# Patient Record
Sex: MALE | Race: WHITE | URBAN - METROPOLITAN AREA
[De-identification: names, ages, dates, MRNs, and addresses within clinical notes are randomized per-mention and may not be internally consistent; named-entity substitution may affect disease eponyms.]

---

## 2017-07-13 ENCOUNTER — OFFICE VISIT (OUTPATIENT)
Dept: OPHTHALMOLOGY | Facility: CLINIC | Age: 82
End: 2017-07-13

## 2017-07-13 DIAGNOSIS — Z86.69 HISTORY OF RETINAL DETACHMENT: ICD-10-CM

## 2017-07-13 DIAGNOSIS — H25.13 SENILE NUCLEAR SCLEROSIS, BILATERAL: ICD-10-CM

## 2017-07-13 DIAGNOSIS — H52.223 REGULAR ASTIGMATISM OF BOTH EYES: Primary | ICD-10-CM

## 2017-07-13 ASSESSMENT — REFRACTION_WEARINGRX
OD_AXIS: 165
OS_AXIS: 40
OS_CYLINDER: +0.75
OD_ADD: +2.50
OS_SPHERE: PLANO
SPECS_TYPE: PAL
OD_CYLINDER: +0.50
OS_ADD: +2.50
OD_SPHERE: +0.25

## 2017-07-13 ASSESSMENT — REFRACTION_MANIFEST
OD_AXIS: 180
OS_ADD: +2.75
OD_SPHERE: PLANO
OS_CYLINDER: +0.50
OS_SPHERE: +0.25
OD_CYLINDER: +0.75
OS_AXIS: 180
OD_ADD: +2.75

## 2017-07-13 ASSESSMENT — TONOMETRY
IOP_METHOD: ICARE
OS_IOP_MMHG: 15
OD_IOP_MMHG: 14

## 2017-07-13 ASSESSMENT — VISUAL ACUITY
OS_CC+: -2
OS_CC: J2
OS_CC: 20/50
METHOD: SNELLEN - LINEAR
CORRECTION_TYPE: GLASSES
OD_CC+: -2
OD_CC: 20/30
OD_CC: J2

## 2017-07-13 ASSESSMENT — CUP TO DISC RATIO
OS_RATIO: 0.2
OD_RATIO: 0.2

## 2017-07-13 ASSESSMENT — EXTERNAL EXAM - LEFT EYE: OS_EXAM: NORMAL

## 2017-07-13 ASSESSMENT — SLIT LAMP EXAM - LIDS
COMMENTS: NORMAL
COMMENTS: NORMAL

## 2017-07-13 ASSESSMENT — EXTERNAL EXAM - RIGHT EYE: OD_EXAM: NORMAL

## 2017-07-13 ASSESSMENT — CONF VISUAL FIELD
OS_NORMAL: 1
OD_NORMAL: 1

## 2017-07-13 NOTE — NURSING NOTE
Chief Complaints and History of Present Illnesses   Patient presents with     Cataract Follow Up     May be slightly Blurred since last visit     HPI    Symptoms:     No floaters   No flashes      Duration:  2 years   Frequency:  Constant          Comments:  Glenny Vera COT 1:59 PM July 13, 2017

## 2017-07-13 NOTE — MR AVS SNAPSHOT
After Visit Summary   2017    You Sotelo    MRN: [id number]           Patient Information     Date Of Birth          1935        Visit Information        Provider Department      2017 2:00 PM Prashant Tyler OD [facility]        Today's Diagnoses     History of retinal detachment    -  1       Follow-ups after your visit        Who to contact     Please call your clinic at [phone] to:    Ask questions about your health    Make or cancel appointments    Discuss your medicines    Learn about your test results    Speak to your doctor   If you have compliments or concerns about an experience at your clinic, or if you wish to file a complaint, please contact [facility] Patient Relations at [phone] or email us at [email or web address]         Additional Information About Your Visit        MyChart Information     [employer] is an electronic gateway that provides easy, online access to your medical records. With [employer], you can request a clinic appointment, read your test results, renew a prescription or communicate with your care team.     To sign up for [employer] visit the website at [email or web address]   You will be asked to enter the access code listed below, as well as some personal information. Please follow the directions to create your username and password.     Your access code is: [email or web address]  Expires: 10/4/2017  6:30 AM     Your access code will  in 90 days. If you need help or a new code, please contact your [facility] or call [phone] for assistance.        Care EveryWhere ID     This is your Care EveryWhere ID. This could be used by other organizations to access your [place] medical records  [id number]         Blood Pressure from Last 3 Encounters:   09/28/15 113/77    Weight from Last 3 Encounters:   10/09/12 101.1 kg (222 lb 12.8 oz)   12  102.6 kg (226 lb 1.6 oz)   08/29/12 102.1 kg (225 lb)              We Performed the Following     OCT Retina Spectralis OU (both eyes)        Primary Care Provider Office Phone # Fax #    [place] VIRGEN Aragon [phone]       [facility]  GENERAL [facility] CLN [address] [facility] [address]        Equal Access to Services     [facility]: Hadii aad ku hadasho Soomaali, waaxda luqadaha, qaybta kaalmada adeegyada, waxay idiin hayaan adeeg kharash la'aan . So [facility] [phone].    ATENCIÓN: Si habla español, tiene a canas disposición servicios gratuitos de asistencia lingüística. Llame al [phone].    We comply with applicable federal civil rights laws and [place] laws. We do not discriminate on the basis of race, color, national origin, age, disability sex, sexual orientation or gender identity.            Thank you!     Thank you for choosing [facility]  for your care. Our goal is always to provide you with excellent care. Hearing back from our patients is one way we can continue to improve our services. Please take a few minutes to complete the written survey that you may receive in the mail after your visit with us. Thank you!             Your Updated Medication List - Protect others around you: Learn how to safely use, store and throw away your medicines at www.disposemymeds.org.          This list is accurate as of: 7/13/17  2:31 PM.  Always use your most recent med list.                   Brand Name Dispense Instructions for use Diagnosis    ALLOPURINOL PO      Take by mouth daily        ATORVASTATIN CALCIUM PO      Take by mouth daily        LEVOTHYROXINE SODIUM PO      Take by mouth daily        METOPROLOL TARTRATE PO           NITROGLYCERIN SL      Place 0.4 mg under the tongue        XARELTO PO

## 2017-07-13 NOTE — PROGRESS NOTES
Assessment/Plan  (H52.223) Regular astigmatism of both eyes  (primary encounter diagnosis)  Comment: Hyperopic astigmatism with presbyopia OU  Plan: Refraction   Educated patient on condition and clinical findings. Dispensed spectacle prescription for full time wear. Monitor annually.    (Z86.69) History of retinal detachment  Comment:  S/p repair with laser and gas bubble, per patient  Plan: OCT Retina Spectralis OU (both eyes)   Condition appears stable, macula flat, normal foveal contour. Blurred vision does not appear to be retinal in etiology.    (H25.13) Senile nuclear sclerosis, bilateral  Comment: Question of visual significance OS  Plan:  Referred to Dr. Monetlongo for consultation.      Complete documentation of historical and exam elements from today's encounter can  be found in the full encounter summary report (not reduplicated in this progress  note). I personally obtained the chief complaint(s) and history of present illness. I  confirmed and edited as necessary the review of systems, past medical/surgical  history, family history, social history, and examination findings as documented by  others; and I examined the patient myself. I personally reviewed the relevant tests,  images, and reports as documented above. I formulated and edited as necessary the  assessment and plan and discussed the findings and management plan with the  patient and family.    Prashant Tyler OD, FAAO

## 2017-08-02 ENCOUNTER — TELEPHONE (OUTPATIENT)
Dept: OPHTHALMOLOGY | Facility: CLINIC | Age: 82
End: 2017-08-02

## 2017-08-03 NOTE — TELEPHONE ENCOUNTER
I spoke with the patient and explained that given the decreased vision and normal retinal findings, I recommended a cataract consultation.  The patient understood, and will call to schedule once other medical issues are stable.

## 2017-08-15 DIAGNOSIS — H25.13 SENILE NUCLEAR SCLEROSIS, BILATERAL: Primary | ICD-10-CM

## 2017-08-29 ENCOUNTER — OFFICE VISIT (OUTPATIENT)
Dept: OPHTHALMOLOGY | Facility: CLINIC | Age: 82
End: 2017-08-29

## 2017-08-29 DIAGNOSIS — H52.203 HYPEROPIC ASTIGMATISM OF BOTH EYES: ICD-10-CM

## 2017-08-29 DIAGNOSIS — H33.22 OLD RETINAL DETACHMENT OF LEFT EYE: ICD-10-CM

## 2017-08-29 DIAGNOSIS — H52.4 PRESBYOPIA: ICD-10-CM

## 2017-08-29 ASSESSMENT — CUP TO DISC RATIO
OD_RATIO: 0.2
OS_RATIO: 0.2

## 2017-08-29 ASSESSMENT — TONOMETRY
OS_IOP_MMHG: 15
IOP_METHOD: ICARE
OD_IOP_MMHG: 14

## 2017-08-29 ASSESSMENT — EXTERNAL EXAM - RIGHT EYE: OD_EXAM: NORMAL

## 2017-08-29 ASSESSMENT — SLIT LAMP EXAM - LIDS
COMMENTS: NORMAL
COMMENTS: NORMAL

## 2017-08-29 ASSESSMENT — CONF VISUAL FIELD
OS_NORMAL: 1
METHOD: COUNTING FINGERS
OD_NORMAL: 1

## 2017-08-29 ASSESSMENT — VISUAL ACUITY
CORRECTION_TYPE: GLASSES
METHOD: SNELLEN - LINEAR
OS_CC: 20/50
OD_CC: 20/30

## 2017-08-29 ASSESSMENT — EXTERNAL EXAM - LEFT EYE: OS_EXAM: NORMAL

## 2017-08-29 NOTE — MR AVS SNAPSHOT
After Visit Summary   2017    You Sotelo    MRN: [id number]           Patient Information     Date Of Birth          1935        Visit Information        Provider Department      2017 2:15 PM Whitley Peña MD [facility]        Today's Diagnoses     Nuclear cataract of both eyes    -  1    Old retinal detachment of left eye        Hyperopic astigmatism of both eyes        Presbyopia           Follow-ups after your visit        Follow-up notes from your care team     Return for repeat cataract evaluation this spring after he returns from [place].      Who to contact     Please call your clinic at [phone] to:    Ask questions about your health    Make or cancel appointments    Discuss your medicines    Learn about your test results    Speak to your doctor   If you have compliments or concerns about an experience at your clinic, or if you wish to file a complaint, please contact [facility] Patient Relations at [phone] or email us at [email or web address]         Additional Information About Your Visit        MyChart Information     [employer] is an electronic gateway that provides easy, online access to your medical records. With [employer], you can request a clinic appointment, read your test results, renew a prescription or communicate with your care team.     To sign up for [employer] visit the website at [email or web address]   You will be asked to enter the access code listed below, as well as some personal information. Please follow the directions to create your username and password.     Your access code is: [email or web address]  Expires: 10/4/2017  6:30 AM     Your access code will  in 90 days. If you need help or a new code, please contact your [facility] or call [phone] for assistance.        Care EveryWhere ID     This is your Care EveryWhere ID. This could be used  by other organizations to access your [place] medical records  [id number]         Blood Pressure from Last 3 Encounters:   09/28/15 113/77    Weight from Last 3 Encounters:   10/09/12 101.1 kg (222 lb 12.8 oz)   09/07/12 102.6 kg (226 lb 1.6 oz)   08/29/12 102.1 kg (225 lb)              Today, you had the following     No orders found for display       Primary Care Provider Office Phone # Fax #    [place] VIRGEN [facility] [phone]       [facility] GENERAL MED CLN [address] [facility] [address]        Equal Access to Services     [facility]: Hadii aad ku hadasho Soeduardo, waaxda luqadaha, qaybta kaalmada adeegyada, connie ventura . So [facility] [phone].    ATENCIÓN: Si habla español, tiene a canas disposición servicios gratuitos de asistencia lingüística. Llame al [phone].    We comply with applicable federal civil rights laws and [place] laws. We do not discriminate on the basis of race, color, national origin, age, disability sex, sexual orientation or gender identity.            Thank you!     Thank you for choosing [facility]  for your care. Our goal is always to provide you with excellent care. Hearing back from our patients is one way we can continue to improve our services. Please take a few minutes to complete the written survey that you may receive in the mail after your visit with us. Thank you!             Your Updated Medication List - Protect others around you: Learn how to safely use, store and throw away your medicines at www.disposemymeds.org.          This list is accurate as of: 8/29/17  3:03 PM.  Always use your most recent med list.                   Brand Name Dispense Instructions for use Diagnosis    ALLOPURINOL PO      Take by mouth daily        ATORVASTATIN CALCIUM PO      Take by mouth daily        LEVOTHYROXINE SODIUM PO      Take by mouth daily        METOPROLOL TARTRATE PO           NITROGLYCERIN SL      Place 0.4 mg  under the tongue        XARELTO PO

## 2017-08-29 NOTE — PROGRESS NOTES
HPI  You Sotelo is a 82 year old male referred by Dr. Tyler for cataract evaluation. Mr. Sotelo states he has been noticing some mild blurred vision in his left eye. He had a retinal detachment repaired in 2007, and his never been quite as good as his right eye since then, but he has noticed more decline over the last year or so. He has difficulty seeing the golf ball in the air, and he occasionally has a little trouble seeing road signs well driving. No pain, redness, or discharge. No flashes/floaters.    Assessment & Plan      (H25.13) Nuclear cataract of both eyes  (primary encounter diagnosis)  Comment: Visually significant left eye with BCVA of 20/40 on last refraction with Dr. Tyler. He does have a history of retinal detachment which my limit his vision some, but he has been as good as 20/25 in that eye with Dr. Snider in 2015.  Plan: Discussed r/b/a of CE/IOL. His wife will be gone on a cruise in October, and then they leave for [place] in the fall. He would rather wait on surgery for now and recheck when he returns in the spring. I think this is reasonable.    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 Stable according to Dr. Tyler's last documented dilated exam 7/2017.  Plan: Observe. Discussed signs/sx of RT/RD and the patient knows to call immediately if they develop these symptoms.     (H52.203) Hyperopic astigmatism of both eyes/(H52.4) Presbyopia  Comment: Vision limited by cataract left eye  Plan: Rx per Dr. Tyler  -----------------------------------------------------------------------------------    Patient disposition:   Return for repeat cataract evaluation this spring after he returns from [place]. or sooner as needed.    Teaching statement:  Complete documentation of historical and exam elements from today's encounter can be found in the full encounter summary report (not reduplicated in this progress note). I personally obtained the chief  complaint(s) and history of present illness.  I confirmed and edited as necessary the review of systems, past medical/surgical history, family history, social history, and examination findings as documented by others; and I examined the patient myself. I personally reviewed the relevant tests, images, and reports as documented above.     I formulated and edited as necessary the assessment and plan and discussed the findings and management plan with the patient and family.    Whitley Peña MD  Comprehensive Ophthalmology & Ocular Pathology  Department of Ophthalmology and Visual Neurosciences  [email or web address]  Pager [phone]

## 2017-08-29 NOTE — NURSING NOTE
Chief Complaints and History of Present Illnesses   Patient presents with     Cataract Evaluation     Blurred Va     HPI    Affected eye(s):  Both   Symptoms:        Duration:  1 month   Frequency:  Constant       Do you have eye pain now?:  No      Comments:  Pt referred by Dr Tyler for Cataract Consultation today  Glenny Vera COT 2:39 PM August 29, 2017

## 2018-07-24 ENCOUNTER — OFFICE VISIT (OUTPATIENT)
Dept: OPHTHALMOLOGY | Facility: CLINIC | Age: 83
End: 2018-07-24
Payer: COMMERCIAL

## 2018-07-24 DIAGNOSIS — H25.13 NUCLEAR SCLEROTIC CATARACT OF BOTH EYES: Primary | ICD-10-CM

## 2018-07-24 DIAGNOSIS — H52.4 PRESBYOPIA: ICD-10-CM

## 2018-07-24 DIAGNOSIS — H33.22 OLD RETINAL DETACHMENT OF LEFT EYE: ICD-10-CM

## 2018-07-24 RX ORDER — CYANOCOBALAMIN 1000 UG/ML
1 INJECTION, SOLUTION INTRAMUSCULAR; SUBCUTANEOUS
COMMUNITY

## 2018-07-24 ASSESSMENT — REFRACTION_MANIFEST
OS_SPHERE: +0.50
OS_CYLINDER: +0.50
OS_AXIS: 005
OD_CYLINDER: +1.25
OS_ADD: +2.75
OD_AXIS: 180
OD_ADD: +2.75
OD_SPHERE: PLANO

## 2018-07-24 ASSESSMENT — REFRACTION_WEARINGRX
OS_AXIS: 40
OS_ADD: +2.50
OD_SPHERE: +0.25
OD_CYLINDER: +0.50
SPECS_TYPE: PAL
OD_ADD: +2.50
OS_SPHERE: PLANO
OD_AXIS: 165
OS_CYLINDER: +0.75

## 2018-07-24 ASSESSMENT — TONOMETRY
IOP_METHOD: TONOPEN
OS_IOP_MMHG: 14
OD_IOP_MMHG: 13

## 2018-07-24 ASSESSMENT — EXTERNAL EXAM - RIGHT EYE: OD_EXAM: NORMAL

## 2018-07-24 ASSESSMENT — SLIT LAMP EXAM - LIDS
COMMENTS: NORMAL
COMMENTS: NORMAL

## 2018-07-24 ASSESSMENT — VISUAL ACUITY
CORRECTION_TYPE: GLASSES
METHOD: SNELLEN - LINEAR
OD_CC: 20/40-
OS_CC: 20/60+2

## 2018-07-24 ASSESSMENT — CONF VISUAL FIELD
OD_NORMAL: 1
METHOD: COUNTING FINGERS
OS_NORMAL: 1

## 2018-07-24 ASSESSMENT — EXTERNAL EXAM - LEFT EYE: OS_EXAM: NORMAL

## 2018-07-24 ASSESSMENT — CUP TO DISC RATIO
OD_RATIO: 0.2
OS_RATIO: 0.2

## 2018-07-24 NOTE — PROGRESS NOTES
HPI  You Sotelo is a 83 year old male here for full eye exam and cataract recheck. He states his vision has been overall good. He played golf yesterday and could see the ball well. No pain, redness, discharge. No flashes/floaters.     Assessment & Plan      (H25.13) Nuclear cataract of both eyes  (primary encounter diagnosis)  Comment: Visually significant left eye with BCVA of 20/30-. He does have a history of retinal detachment which my limit his vision some, but he has been as good as 20/25 in that eye with Dr. Snider in 2015.  Plan: Since he is not having any functional difficulties, observe for now.    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  Plan: Observe. Discussed signs/sx of RT/RD and the patient knows to call immediately if they develop these symptoms.     (H52.203) Hyperopic astigmatism of both eyes/(H52.4) Presbyopia  Comment: Improved vision with refraction, left eye vision limited as above.  Plan: Given updated glasses Rx.   -----------------------------------------------------------------------------------    Patient disposition:   Return in about 1 year (around 7/24/2019). or sooner as needed.    Teaching statement:  Complete documentation of historical and exam elements from today's encounter can be found in the full encounter summary report (not reduplicated in this progress note). I personally obtained the chief complaint(s) and history of present illness.  I confirmed and edited as necessary the review of systems, past medical/surgical history, family history, social history, and examination findings as documented by others; and I examined the patient myself. I personally reviewed the relevant tests, images, and reports as documented above.     I formulated and edited as necessary the assessment and plan and discussed the findings and management plan with the patient and family.    Whitley Peña MD  Comprehensive Ophthalmology & Ocular  Pathology  Department of Ophthalmology and Visual Neurosciences  [email or web address]  Pager [phone]

## 2018-07-24 NOTE — MR AVS SNAPSHOT
After Visit Summary   2018    You Sotelo    MRN: [id number]           Patient Information     Date Of Birth          1935        Visit Information        Provider Department      2018 1:00 PM Whitley Peña MD [facility]        Today's Diagnoses     Nuclear sclerotic cataract of both eyes    -  1    Old retinal detachment of left eye        Presbyopia           Follow-ups after your visit        Follow-up notes from your care team     Return in about 1 year (around 2019).      Who to contact     Please call your clinic at [phone] to:    Ask questions about your health    Make or cancel appointments    Discuss your medicines    Learn about your test results    Speak to your doctor            Additional Information About Your Visit        MyChart Information     [employer] is an electronic gateway that provides easy, online access to your medical records. With [employer], you can request a clinic appointment, read your test results, renew a prescription or communicate with your care team.     To sign up for [employer] visit the website at [email or web address]   You will be asked to enter the access code listed below, as well as some personal information. Please follow the directions to create your username and password.     Your access code is: [email or web address]  Expires: 10/8/2018  6:30 AM     Your access code will  in 90 days. If you need help or a new code, please contact your [facility] or call [phone] for assistance.        Care EveryWhere ID     This is your Care EveryWhere ID. This could be used by other organizations to access your [place] medical records  [id number]         Blood Pressure from Last 3 Encounters:   09/28/15 113/77    Weight from Last 3 Encounters:   10/09/12 101.1 kg (222 lb 12.8 oz)   12 102.6 kg (226 lb 1.6 oz)   12 102.1 kg (225 lb)              Today, you had the  following     No orders found for display         Today's Medication Changes          These changes are accurate as of 7/24/18  1:43 PM.  If you have any questions, ask your nurse or doctor.               Stop taking these medicines if you haven't already. Please contact your care team if you have questions.     ALLOPURINOL PO   Stopped by:  Whitley Peña MD                    Primary Care Provider Office Phone # Fax #    [place] NEW MD Mahesh [phone]       [facility] [address] [place] [address]        Equal Access to Services     [facility]: Hadii aad ku hadasho Soomaali, waaxda luqadaha, qaybta kaalmada adeegyada, waxjoshua ventura . So [facility] [phone].    ATENCIÓN: Si habla español, tiene a canas disposición servicios gratuitos de asistencia lingüística. [facility] [phone].    We comply with applicable federal civil rights laws and [place] laws. We do not discriminate on the basis of race, color, national origin, age, disability, sex, sexual orientation, or gender identity.            Thank you!     Thank you for choosing [facility]  for your care. Our goal is always to provide you with excellent care. Hearing back from our patients is one way we can continue to improve our services. Please take a few minutes to complete the written survey that you may receive in the mail after your visit with us. Thank you!             Your Updated Medication List - Protect others around you: Learn how to safely use, store and throw away your medicines at www.disposemymeds.org.          This list is accurate as of 7/24/18  1:43 PM.  Always use your most recent med list.                   Brand Name Dispense Instructions for use Diagnosis    ATORVASTATIN CALCIUM PO      Take by mouth daily        cyanocobalamin 1000 MCG/ML injection    VITAMIN B12     Inject 1 mL into the muscle every 30 days        LEVOTHYROXINE SODIUM PO      Take by  mouth daily        METOPROLOL TARTRATE PO           NITROGLYCERIN SL      Place 0.4 mg under the tongue        XARELTO PO